# Patient Record
Sex: FEMALE | Race: WHITE
[De-identification: names, ages, dates, MRNs, and addresses within clinical notes are randomized per-mention and may not be internally consistent; named-entity substitution may affect disease eponyms.]

---

## 2018-03-27 ENCOUNTER — HOSPITAL ENCOUNTER (EMERGENCY)
Dept: HOSPITAL 26 - MED | Age: 73
Discharge: HOME | End: 2018-03-27
Payer: MEDICAID

## 2018-03-27 VITALS — SYSTOLIC BLOOD PRESSURE: 149 MMHG | DIASTOLIC BLOOD PRESSURE: 72 MMHG

## 2018-03-27 VITALS — WEIGHT: 150 LBS | HEIGHT: 66 IN | BODY MASS INDEX: 24.11 KG/M2

## 2018-03-27 DIAGNOSIS — E11.9: ICD-10-CM

## 2018-03-27 DIAGNOSIS — M79.622: ICD-10-CM

## 2018-03-27 DIAGNOSIS — N39.0: Primary | ICD-10-CM

## 2018-03-27 DIAGNOSIS — I10: ICD-10-CM

## 2018-03-27 PROCEDURE — S0119 ONDANSETRON 4 MG: HCPCS

## 2018-03-27 PROCEDURE — 87086 URINE CULTURE/COLONY COUNT: CPT

## 2018-03-27 PROCEDURE — 99283 EMERGENCY DEPT VISIT LOW MDM: CPT

## 2018-03-27 PROCEDURE — 96372 THER/PROPH/DIAG INJ SC/IM: CPT
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
## 2018-03-27 NOTE — NUR
Patient discharged with v/s stable. Written and verbal after care instructions 
given and explained. Patient alert, oriented and verbalized understanding of 
instructions. Ambulatory with steady gait. All questions addressed prior to 
discharge. ID band removed. Patient advised to follow up with PMD. Rx of 
motrin, cipro, and zofran given. Patient educated on indication of medication 
including possible reaction and side effects. Opportunity to ask questions 
provided and answered.

## 2018-03-29 NOTE — NUR
PATIENT URINE CULTURE RETURNED AND E COLI RESISTANT TO CIPRO. PATIENT WAS 
CONTACTED VIA PHONE AND SPOKE Chinese TO FATOUMATA RIVERO. STATES SHE IS FEELING 
BETTER AND HAS AN APPOINTMENT WITH HER PRIMARY MD ON TUES. NO FURTHER ACTION 
TAKEN.